# Patient Record
Sex: MALE | Race: WHITE | NOT HISPANIC OR LATINO | Employment: OTHER | URBAN - NONMETROPOLITAN AREA
[De-identification: names, ages, dates, MRNs, and addresses within clinical notes are randomized per-mention and may not be internally consistent; named-entity substitution may affect disease eponyms.]

---

## 2023-09-08 ENCOUNTER — OFFICE VISIT (OUTPATIENT)
Dept: URGENT CARE | Facility: CLINIC | Age: 78
End: 2023-09-08
Payer: COMMERCIAL

## 2023-09-08 VITALS
WEIGHT: 204.8 LBS | BODY MASS INDEX: 31.04 KG/M2 | TEMPERATURE: 97 F | HEART RATE: 88 BPM | DIASTOLIC BLOOD PRESSURE: 64 MMHG | OXYGEN SATURATION: 97 % | SYSTOLIC BLOOD PRESSURE: 130 MMHG | HEIGHT: 68 IN

## 2023-09-08 DIAGNOSIS — I80.01 THROMBOPHLEBITIS OF SUPERFICIAL VEINS OF RIGHT LOWER EXTREMITY: Primary | ICD-10-CM

## 2023-09-08 PROCEDURE — 99213 OFFICE O/P EST LOW 20 MIN: CPT

## 2023-09-08 PROCEDURE — S9088 SERVICES PROVIDED IN URGENT: HCPCS

## 2023-09-08 RX ORDER — DULAGLUTIDE 1.5 MG/.5ML
1.5 INJECTION, SOLUTION SUBCUTANEOUS
COMMUNITY

## 2023-09-08 RX ORDER — OMEPRAZOLE 20 MG/1
20 CAPSULE, DELAYED RELEASE ORAL DAILY
COMMUNITY

## 2023-09-08 RX ORDER — PYRIDOSTIGMINE BROMIDE 60 MG/5ML
SOLUTION ORAL
COMMUNITY

## 2023-09-08 RX ORDER — PREDNISONE 20 MG/1
TABLET ORAL DAILY
COMMUNITY

## 2023-09-08 RX ORDER — HYDROCODONE BITARTRATE AND IBUPROFEN 7.5; 2 MG/1; MG/1
1 TABLET, FILM COATED ORAL EVERY 6 HOURS PRN
COMMUNITY

## 2023-09-08 RX ORDER — VALSARTAN 80 MG/1
80 TABLET ORAL DAILY
COMMUNITY

## 2023-09-08 RX ORDER — GLIMEPIRIDE 2 MG/1
2 TABLET ORAL
COMMUNITY

## 2023-09-08 NOTE — PROGRESS NOTES
[facility]        NAME: Jared Saint is a 69 y.o. male  : 1945    MRN: [id number]  DATE: 2023  TIME: 12:02 PM    Assessment and Plan   Thrombophlebitis of superficial veins of right lower extremity [I80.01]  1. Thrombophlebitis of superficial veins of right lower extremity          Based on presentation, most likely superficial thrombophlebitis but cannot exclude DVT. For that reason advised to go to the ER for further evaluation. He stated that he tried to contact his PCP but he is in the hospital right now and is unsure when he would be able to follow-up with someone. Patient verbalized understanding and stated he would go to [facility]. Patient Instructions     Proceed to  ER    Chief Complaint     Chief Complaint   Patient presents with   • Red area on leg     C/o red area on patients right lower leg, pt states it started x2 months ago and is occasionally warm to touch. Denies any pain in the area. History of Present Illness       Patient is presenting for red area on his right lower leg x2 months. He stated it is occasionally warm to the touch and gets swollen. He denies any pain to the area. He denies any problems walking. He has no history of blood clots. He is on aspirin but no other blood thinners. He denies CP or SOB. He denies any known injury. Review of Systems   Review of Systems   Constitutional: Negative for fatigue. Respiratory: Negative for chest tightness and shortness of breath. Cardiovascular: Negative for chest pain and palpitations. Skin: Positive for color change (with slight swelling and firmness).          Current Medications       Current Outpatient Medications:   •  aspirin (ECOTRIN LOW STRENGTH) 81 mg EC tablet, Take 81 mg by mouth daily, Disp: , Rfl:   •  dulaglutide (Trulicity) 1.5 OU/5.1QR injection, Inject 1.5 mg under the skin every 7 days, Disp: , Rfl:   •  glimepiride (AMARYL) 2 mg tablet, Take 2 mg by mouth every morning before breakfast, Disp: , Rfl:   •  HYDROcodone-ibuprofen (VICOPROFEN) 7.5-200 mg per tablet, Take 1 tablet by mouth every 6 (six) hours as needed for moderate pain, Disp: , Rfl:   •  metFORMIN (GLUCOPHAGE) 850 mg tablet, Take 850 mg by mouth, Disp: , Rfl:   •  omeprazole (PriLOSEC) 20 mg delayed release capsule, Take 20 mg by mouth daily, Disp: , Rfl:   •  predniSONE 20 mg tablet, Take by mouth daily, Disp: , Rfl:   •  Pyridostigmine Bromide 60 MG/5ML SOLN, Take by mouth, Disp: , Rfl:   •  valsartan (DIOVAN) 80 mg tablet, Take 80 mg by mouth daily, Disp: , Rfl:     Current Allergies     Allergies as of 09/08/2023 - Reviewed 09/08/2023   Allergen Reaction Noted   • Penicillins Anaphylaxis 12/06/2005            The following portions of the patient's history were reviewed and updated as appropriate: allergies, current medications, past family history, past medical history, past social history, past surgical history and problem list.     Past Medical History:   Diagnosis Date   • Arthritis    • Villalobos's esophagus    • Coronary artery disease    • Diabetes ([address])    • Heart attack ([address])    • Myasthenia gravis ([address])        Past Surgical History:   Procedure Laterality Date   • CORONARY ANGIOPLASTY WITH STENT PLACEMENT     • SPINAL FUSION     • TOTAL HIP ARTHROPLASTY         Family History   Problem Relation Age of Onset   • Heart disease Mother    • Cancer Father          Medications have been verified. Objective   /64   Pulse 88   Temp (!) 97 °F (36.1 °C)   Ht 5' 8" (1.727 m)   Wt 92.9 kg (204 lb 12.8 oz)   SpO2 97%   BMI 31.14 kg/m²        Physical Exam     Physical Exam  Constitutional:       Appearance: Normal appearance. HENT:      Head: Normocephalic. Eyes:      Extraocular Movements: Extraocular movements intact. Pupils: Pupils are equal, round, and reactive to light. Cardiovascular:      Rate and Rhythm: Normal rate and regular rhythm. Pulses: Normal pulses.       Heart sounds: Normal heart sounds. Pulmonary:      Effort: Pulmonary effort is normal.      Breath sounds: Normal breath sounds. Musculoskeletal:         General: No swelling or tenderness. Normal range of motion. Skin:     General: Skin is warm and dry. Findings: Erythema (firmess to palpation) present. Neurological:      General: No focal deficit present. Mental Status: He is alert and oriented to person, place, and time. Mental status is at baseline.

## 2025-06-09 ENCOUNTER — OFFICE VISIT (OUTPATIENT)
Dept: URGENT CARE | Facility: CLINIC | Age: 80
End: 2025-06-09
Payer: COMMERCIAL

## 2025-06-09 VITALS
WEIGHT: 174 LBS | SYSTOLIC BLOOD PRESSURE: 108 MMHG | HEIGHT: 67 IN | HEART RATE: 89 BPM | DIASTOLIC BLOOD PRESSURE: 68 MMHG | TEMPERATURE: 98 F | RESPIRATION RATE: 20 BRPM | OXYGEN SATURATION: 96 % | BODY MASS INDEX: 27.31 KG/M2

## 2025-06-09 DIAGNOSIS — N30.01 ACUTE CYSTITIS WITH HEMATURIA: Primary | ICD-10-CM

## 2025-06-09 LAB
SL AMB  POCT GLUCOSE, UA: 100
SL AMB LEUKOCYTE ESTERASE,UA: ABNORMAL
SL AMB POCT BILIRUBIN,UA: NEGATIVE
SL AMB POCT BLOOD,UA: ABNORMAL
SL AMB POCT CLARITY,UA: ABNORMAL
SL AMB POCT COLOR,UA: YELLOW
SL AMB POCT KETONES,UA: NEGATIVE
SL AMB POCT NITRITE,UA: NEGATIVE
SL AMB POCT PH,UA: 5
SL AMB POCT SPECIFIC GRAVITY,UA: 1.03
SL AMB POCT URINE PROTEIN: ABNORMAL
SL AMB POCT UROBILINOGEN: 0.2

## 2025-06-09 PROCEDURE — 99213 OFFICE O/P EST LOW 20 MIN: CPT

## 2025-06-09 PROCEDURE — 81002 URINALYSIS NONAUTO W/O SCOPE: CPT

## 2025-06-09 PROCEDURE — 87086 URINE CULTURE/COLONY COUNT: CPT

## 2025-06-09 PROCEDURE — S9088 SERVICES PROVIDED IN URGENT: HCPCS

## 2025-06-09 RX ORDER — ESCITALOPRAM OXALATE 10 MG/1
10 TABLET ORAL DAILY
COMMUNITY

## 2025-06-09 RX ORDER — SULFAMETHOXAZOLE AND TRIMETHOPRIM 800; 160 MG/1; MG/1
1 TABLET ORAL EVERY 12 HOURS SCHEDULED
Qty: 14 TABLET | Refills: 0 | Status: SHIPPED | OUTPATIENT
Start: 2025-06-09 | End: 2025-06-16

## 2025-06-09 NOTE — PATIENT INSTRUCTIONS
Take full course of Bactrim as prescribed  Eat yogurt with live and active cultures and/or take a probiotic at least 3 hours before or after antibiotic dose. Monitor stool for diarrhea and/or blood. If this occurs, contact primary care doctor ASAP.     Drink plenty of water   Cranberry supplements  Urinate within 5 minutes following intercourse  Follow up with Urology    Follow up with PCP in 3-5 days.  Proceed to  ER if symptoms worsen.    If tests are performed, our office will contact you with results only if changes need to made to the care plan discussed with you at the visit. You can review your full results on [facility].

## 2025-06-09 NOTE — PROGRESS NOTES
[facility]        NAME: Rickey Baires is a 79 y.o. male  : 1945    MRN: [id number]  DATE: 2025  TIME: 3:56 PM    Assessment and Plan   Acute cystitis with hematuria [N30.01]  1. Acute cystitis with hematuria  POCT urine dip    Urine culture    sulfamethoxazole-trimethoprim (BACTRIM DS) 800-160 mg per tablet    Ambulatory Referral to Urology        Urine dip - positive for infection  Urine culture is pending - We will only notify you if there needs to be a change in your treatment plan.   Recent culture from [facility] reviewed for susceptibility from     Patient Instructions     Take full course of Bactrim as prescribed  Eat yogurt with live and active cultures and/or take a probiotic at least 3 hours before or after antibiotic dose. Monitor stool for diarrhea and/or blood. If this occurs, contact primary care doctor ASAP.     Drink plenty of water   Cranberry supplements  Urinate within 5 minutes following intercourse  Follow up with Urology    Follow up with PCP in 3-5 days.  Proceed to  ER if symptoms worsen.    If tests are performed, our office will contact you with results only if changes need to made to the care plan discussed with you at the visit. You can review your full results on [facility].    Chief Complaint     Chief Complaint   Patient presents with   • Possible UTI     C/O UTI which started 10 days ago, burning upon urination associated with frequency and bloody urine. Seen in [facility] for same and placed on Macrobid. Received a call that needed different antibiotic but patient reports went to pharmacy and no prescription there for him. Reports some improvement in symptoms but frequency and burning continue.         History of Present Illness       79-year-old male arrives reporting increased frequency and urgency with urination and burning with urination ongoing for the past 10 days.  Patient reports he was seen at [facility] and was given a  "prescription of Macrobid which he did complete but did not have any improvement of symptoms.  Patient reports he was notified that he had resistance to Macrobid and was told there would be another antibiotic prescription for him however when he went to the pharmacy there was no prescription for him there.  Patient denies any blood clots with urination.  Patient denies any penile pain or discharge.  Patient denies any testicular pain or swelling.  Patient denies any fevers.  Patient denies any abdominal pain.    Review of Systems   Review of Systems   Constitutional: Negative.    HENT: Negative.     Respiratory: Negative.     Cardiovascular: Negative.    Gastrointestinal: Negative.  Negative for abdominal pain.   Genitourinary:  Positive for dysuria, frequency and urgency. Negative for hematuria, penile swelling, scrotal swelling and testicular pain.   Musculoskeletal: Negative.          Current Medications     Current Medications[1]    Current Allergies     Allergies as of 06/09/2025 - Reviewed 06/09/2025   Allergen Reaction Noted   • Immune globulin Hives and Rash 09/06/2022   • Penicillins Anaphylaxis 12/06/2005   • Empagliflozin Other (See Comments) 05/23/2023   • Ciprofloxacin Other (See Comments) 06/06/2024            The following portions of the patient's history were reviewed and updated as appropriate: allergies, current medications, past family history, past medical history, past social history, past surgical history and problem list.     Past Medical History[2]    Past Surgical History[3]    Family History[4]      Medications have been verified.        Objective   /68   Pulse 89   Temp 98 °F (36.7 °C)   Resp 20   Ht 5' 7\" (1.702 m)   Wt 78.9 kg (174 lb)   SpO2 96%   BMI 27.25 kg/m²        Physical Exam     Physical Exam  Vitals and nursing note reviewed.   Constitutional:       General: He is not in acute distress.     Appearance: Normal appearance. He is not ill-appearing, toxic-appearing or " diaphoretic.   HENT:      Head: Normocephalic.      Right Ear: Tympanic membrane, ear canal and external ear normal.      Left Ear: Tympanic membrane, ear canal and external ear normal.      Nose: Nose normal.      Mouth/Throat:      Mouth: Mucous membranes are moist.     Eyes:      Extraocular Movements: Extraocular movements intact.      Pupils: Pupils are equal, round, and reactive to light.       Cardiovascular:      Rate and Rhythm: Normal rate.      Pulses: Normal pulses.      Heart sounds: Normal heart sounds.   Pulmonary:      Effort: Pulmonary effort is normal. No respiratory distress.      Breath sounds: Normal breath sounds. No stridor. No wheezing, rhonchi or rales.   Chest:      Chest wall: No tenderness.   Abdominal:      General: Bowel sounds are normal. There is no distension.      Palpations: Abdomen is soft. There is no mass.      Tenderness: There is no abdominal tenderness. There is no right CVA tenderness, left CVA tenderness, guarding or rebound.      Hernia: No hernia is present.     Musculoskeletal:         General: Normal range of motion.      Cervical back: Normal range of motion.   Lymphadenopathy:      Cervical: No cervical adenopathy.     Skin:     General: Skin is warm and dry.      Capillary Refill: Capillary refill takes less than 2 seconds.     Neurological:      General: No focal deficit present.      Mental Status: He is alert and oriented to person, place, and time.     Psychiatric:         Mood and Affect: Mood normal.                      [1]    Current Outpatient Medications:   •  aspirin (ECOTRIN LOW STRENGTH) 81 mg EC tablet, Take 81 mg by mouth in the morning., Disp: , Rfl:   •  escitalopram (LEXAPRO) 10 mg tablet, Take 10 mg by mouth daily, Disp: , Rfl:   •  metFORMIN (GLUCOPHAGE) 850 mg tablet, Take 850 mg by mouth, Disp: , Rfl:   •  predniSONE 20 mg tablet, Take by mouth daily (Patient taking differently: Take 10 mg by mouth daily), Disp: , Rfl:   •  Pyridostigmine  Bromide 60 MG/5ML SOLN, Take by mouth, Disp: , Rfl:   •  sulfamethoxazole-trimethoprim (BACTRIM DS) 800-160 mg per tablet, Take 1 tablet by mouth every 12 (twelve) hours for 7 days, Disp: 14 tablet, Rfl: 0  •  dulaglutide (Trulicity) 1.5 MG/0.5ML injection, Inject 1.5 mg under the skin every 7 days (Patient not taking: Reported on 6/9/2025), Disp: , Rfl:   •  glimepiride (AMARYL) 2 mg tablet, Take 2 mg by mouth every morning before breakfast (Patient not taking: Reported on 6/9/2025), Disp: , Rfl:   •  HYDROcodone-ibuprofen (VICOPROFEN) 7.5-200 mg per tablet, Take 1 tablet by mouth every 6 (six) hours as needed for moderate pain (Patient not taking: Reported on 6/9/2025), Disp: , Rfl:   •  omeprazole (PriLOSEC) 20 mg delayed release capsule, Take 20 mg by mouth daily (Patient not taking: Reported on 6/9/2025), Disp: , Rfl:   •  valsartan (DIOVAN) 80 mg tablet, Take 80 mg by mouth daily (Patient not taking: Reported on 6/9/2025), Disp: , Rfl:   [2]  Past Medical History:  Diagnosis Date   • Arthritis    • Villalobos's esophagus    • Coronary artery disease    • Diabetes (HCC)    • Heart attack (HCC)    • Myasthenia gravis (HCC)    [3]  Past Surgical History:  Procedure Laterality Date   • CORONARY ANGIOPLASTY WITH STENT PLACEMENT     • SPINAL FUSION     • TOE AMPUTATION Right     all toes on right foot amputated.   • TOTAL HIP ARTHROPLASTY     [4]  Family History  Problem Relation Name Age of Onset   • Heart disease Mother     • Cancer Father

## 2025-06-10 ENCOUNTER — RESULTS FOLLOW-UP (OUTPATIENT)
Dept: URGENT CARE | Facility: CLINIC | Age: 80
End: 2025-06-10

## 2025-06-10 LAB — BACTERIA UR CULT: NORMAL

## 2025-06-10 NOTE — TELEPHONE ENCOUNTER
Spoke with patient's wife regarding negative urine culture. Can d/c ABX and follow up with PCP or Urology if symptoms persist. Verbalized understanding and no questions or concerns.